# Patient Record
Sex: FEMALE | URBAN - METROPOLITAN AREA
[De-identification: names, ages, dates, MRNs, and addresses within clinical notes are randomized per-mention and may not be internally consistent; named-entity substitution may affect disease eponyms.]

---

## 2021-10-10 ENCOUNTER — EMERGENCY (EMERGENCY)
Facility: HOSPITAL | Age: 81
LOS: 1 days | Discharge: HOME | End: 2021-10-10
Attending: STUDENT IN AN ORGANIZED HEALTH CARE EDUCATION/TRAINING PROGRAM
Payer: COMMERCIAL

## 2021-10-10 VITALS
DIASTOLIC BLOOD PRESSURE: 96 MMHG | SYSTOLIC BLOOD PRESSURE: 171 MMHG | TEMPERATURE: 98 F | HEART RATE: 71 BPM | RESPIRATION RATE: 20 BRPM | WEIGHT: 132.06 LBS | OXYGEN SATURATION: 97 %

## 2021-10-10 PROCEDURE — 99284 EMERGENCY DEPT VISIT MOD MDM: CPT

## 2021-10-10 PROCEDURE — 71250 CT THORAX DX C-: CPT | Mod: 26,MD

## 2021-10-10 PROCEDURE — 71250 CT THORAX DX C-: CPT | Mod: MD

## 2021-10-10 PROCEDURE — 99284 EMERGENCY DEPT VISIT MOD MDM: CPT | Mod: 25

## 2021-10-10 RX ORDER — ACETAMINOPHEN 500 MG
650 TABLET ORAL ONCE
Refills: 0 | Status: COMPLETED | OUTPATIENT
Start: 2021-10-10 | End: 2021-10-10

## 2021-10-10 RX ORDER — LIDOCAINE 4 G/100G
1 CREAM TOPICAL ONCE
Refills: 0 | Status: COMPLETED | OUTPATIENT
Start: 2021-10-10 | End: 2021-10-10

## 2021-10-10 RX ADMIN — LIDOCAINE 1 PATCH: 4 CREAM TOPICAL at 12:59

## 2021-10-10 RX ADMIN — Medication 650 MILLIGRAM(S): at 12:59
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
## 2021-10-10 NOTE — ED PROVIDER NOTE - PHYSICAL EXAMINATION
Gen: NAD, AOx3, able to make needs known, non-toxic  Head: NCAT  HEENT: EOMI, oral mucosa moist, normal conjunctiva  Lung: CTAB, no respiratory distress, no wheezes/rhonchi/rales B/L, speaking in full sentences  CV: RRR, no murmurs  Abd: no distended, soft, nontender, no guarding, no CVA tenderness, no seatbelt sign  MSK: +para thoracic pain, no midline spinal tenderness, no visible deformities  Neuro: Appears non focal  Skin: Warm, well perfused, no rash  Psych: normal affect
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
## 2021-10-10 NOTE — ED ADULT NURSE NOTE - NS ED NOTE  TALK SOMEONE YN
No < from: 12 Lead ECG (07.23.20 @ 10:25) >      Ventricular Rate 80 BPM    Atrial Rate 80 BPM    P-R Interval 126 ms    QRS Duration 80 ms    Q-T Interval 370 ms    QTC Calculation(Bezet) 426 ms    P Axis 63 degrees    R Axis 69 degrees    T Axis 67 degrees    Diagnosis Line Normal sinus rhythm  Normal ECG    < end of copied text >
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
## 2021-10-10 NOTE — ED PROVIDER NOTE - PROGRESS NOTE DETAILS
daughter at bedside. patient comfortable with dc with outpt ortho f/u. informed of incidental findings on CT. has no pain over AC joint. ortho info given for outpt f/u

## 2021-10-10 NOTE — ED PROVIDER NOTE - CARE PROVIDER_API CALL
Derrell Segura)  Orthopaedic Surgery  [address] [place] [address]  Phone: [phone]  Fax: [phone]  Follow Up Time: 7-10 Days

## 2021-10-10 NOTE — ED PROVIDER NOTE - PATIENT PORTAL LINK FT
You can access the FollowMyHealth Patient Portal offered by [facility] by registering at the following website: [email or web address]. By joining [facility]’s FollowMyHealth portal, you will also be able to view your health information using other applications (apps) compatible with our system.

## 2021-10-10 NOTE — ED PROVIDER NOTE - NSFOLLOWUPINSTRUCTIONS_ED_ALL_ED_FT
Acromioclavicular Separation    WHAT YOU NEED TO KNOW:    An acromioclavicular separation (AS), or shoulder separation, is when your shoulder and collarbone move or come apart. The bones move or come apart because the ligaments that hold the bones in place are stretched or torn.    DISCHARGE INSTRUCTIONS:    Medicines: You may need any of the following:   •Acetaminophen decreases pain and is available without a doctor's order. Ask how much to take and how often to take it. Follow directions. Acetaminophen can cause liver damage if not taken correctly.      •NSAIDs help decrease swelling and pain. This medicine is available with or without a doctor's order. NSAIDs can cause stomach bleeding or kidney problems in certain people. If you take blood thinner medicine, always ask your healthcare provider if NSAIDs are safe for you. Always read the medicine label and follow directions.      •A Tetanus (Td) vaccine may be needed if you have an open wound. This vaccine is a booster shot used to help prevent diphtheria and tetanus.      •Take your medicine as directed. Contact your healthcare provider if you think your medicine is not helping or if you have side effects. Tell him if you are allergic to any medicine. Keep a list of the medicines, vitamins, and herbs you take. Include the amounts, and when and why you take them. Bring the list or the pill bottles to follow-up visits. Carry your medicine list with you in case of an emergency.      Apply ice: Apply ice on your shoulder for 15 to 20 minutes every hour for the first 1 to 2 days. Use an ice pack, or put crushed ice in a plastic bag. Cover it with a towel. Ice helps prevent tissue damage and decreases swelling and pain.    Apply heat: Apply heat on your shoulder for 20 to 30 minutes every 2 hours after the first 1 to 2 days. Heat helps decrease pain and muscle spasms.    Wear your support device: You may need to wear a strap, elastic bandage, or sling. These devices keep your shoulder in the correct position so it can heal.   •Wear the strap or sling constantly for 6 to 8 weeks, even when you sleep. You may remove the strap or sling when you bathe. Do not move your shoulder or arm when the strap or sling is off. Do not lift your arm.      •The strap or sling must be tightened by another person every day. Tighten it enough to keep your shoulders back in the correct posture. Tell the person to allow enough room to fit an index finger between your body and the strap. Put a folded wash cloth in your armpit to prevent pressure on the nerves by the strap. Loosen the strap if you feel numbness or tingling in your arm or hand.      Rest your shoulder: Rest your shoulder as much as possible to decrease swelling and help it heal.     Follow up with your healthcare provider as directed: Write down your questions so you remember to ask them during your visits.     Contact your healthcare provider if:   •You have a fever.      •You have worse pain, even after you take medicine.      •You have an open wound that is red, swollen, or draining pus.      •Your arm or hand becomes numb or tingles.      •You have questions or concerns about your condition or care.      Return to the emergency department if:   •You lose feeling in your arm or hand.      •You cannot move your arm or hand.

## 2023-04-12 ENCOUNTER — OFFICE (OUTPATIENT)
Dept: URBAN - METROPOLITAN AREA CLINIC 88 | Facility: CLINIC | Age: 83
Setting detail: OPHTHALMOLOGY
End: 2023-04-12
Payer: MEDICARE

## 2023-04-12 DIAGNOSIS — H43.393: ICD-10-CM

## 2023-04-12 DIAGNOSIS — H35.3132: ICD-10-CM

## 2023-04-12 DIAGNOSIS — H43.813: ICD-10-CM

## 2023-04-12 DIAGNOSIS — H31.002: ICD-10-CM

## 2023-04-12 PROCEDURE — 92235 FLUORESCEIN ANGRPH MLTIFRAME: CPT | Performed by: SPECIALIST

## 2023-04-12 PROCEDURE — 92134 CPTRZ OPH DX IMG PST SGM RTA: CPT | Performed by: SPECIALIST

## 2023-04-12 PROCEDURE — 99213 OFFICE O/P EST LOW 20 MIN: CPT | Performed by: SPECIALIST

## 2023-04-12 ASSESSMENT — VISUAL ACUITY
OS_BCVA: 20/50-
OD_BCVA: 20/40

## 2023-04-12 ASSESSMENT — KERATOMETRY
OS_K1POWER_DIOPTERS: 46.25
OD_K2POWER_DIOPTERS: 48.00
OD_AXISANGLE_DEGREES: 068
OS_K2POWER_DIOPTERS: 47.50
OD_K1POWER_DIOPTERS: 45.75
OS_AXISANGLE_DEGREES: 050

## 2023-04-12 ASSESSMENT — REFRACTION_AUTOREFRACTION
OS_CYLINDER: -1.25
OS_AXIS: 126
OS_SPHERE: +1.00
OD_CYLINDER: -2.00
OD_SPHERE: +0.75
OD_AXIS: 151

## 2023-04-12 ASSESSMENT — AXIALLENGTH_DERIVED
OS_AL: 22.283
OD_AL: 22.5024

## 2023-04-12 ASSESSMENT — CONFRONTATIONAL VISUAL FIELD TEST (CVF)
OS_FINDINGS: FULL
OD_FINDINGS: FULL

## 2023-04-12 ASSESSMENT — SPHEQUIV_DERIVED
OD_SPHEQUIV: -0.25
OS_SPHEQUIV: 0.375

## 2023-04-12 ASSESSMENT — TONOMETRY
OS_IOP_MMHG: 12
OD_IOP_MMHG: 12

## 2024-02-19 PROBLEM — G35 MULTIPLE SCLEROSIS: Chronic | Status: ACTIVE | Noted: 2021-10-10

## 2024-03-02 ENCOUNTER — APPOINTMENT (OUTPATIENT)
Dept: FAMILY MEDICINE | Facility: CLINIC | Age: 84
End: 2024-03-02
Payer: MEDICARE

## 2024-03-02 ENCOUNTER — NON-APPOINTMENT (OUTPATIENT)
Age: 84
End: 2024-03-02

## 2024-03-02 ENCOUNTER — LABORATORY RESULT (OUTPATIENT)
Age: 84
End: 2024-03-02

## 2024-03-02 VITALS
HEIGHT: 63 IN | RESPIRATION RATE: 16 BRPM | HEART RATE: 60 BPM | OXYGEN SATURATION: 97 % | WEIGHT: 145 LBS | BODY MASS INDEX: 25.69 KG/M2 | DIASTOLIC BLOOD PRESSURE: 78 MMHG | TEMPERATURE: 96 F | SYSTOLIC BLOOD PRESSURE: 124 MMHG

## 2024-03-02 VITALS
HEART RATE: 60 BPM | DIASTOLIC BLOOD PRESSURE: 78 MMHG | TEMPERATURE: 96 F | HEIGHT: 64 IN | OXYGEN SATURATION: 97 % | WEIGHT: 145 LBS | SYSTOLIC BLOOD PRESSURE: 124 MMHG | BODY MASS INDEX: 24.75 KG/M2

## 2024-03-02 DIAGNOSIS — E78.5 HYPERLIPIDEMIA, UNSPECIFIED: ICD-10-CM

## 2024-03-02 DIAGNOSIS — M81.0 AGE-RELATED OSTEOPOROSIS W/OUT CURRENT PATHOLOGICAL FRACTURE: ICD-10-CM

## 2024-03-02 DIAGNOSIS — I10 ESSENTIAL (PRIMARY) HYPERTENSION: ICD-10-CM

## 2024-03-02 DIAGNOSIS — Z00.00 ENCOUNTER FOR GENERAL ADULT MEDICAL EXAMINATION W/OUT ABNORMAL FINDINGS: ICD-10-CM

## 2024-03-02 DIAGNOSIS — Z13.29 ENCOUNTER FOR SCREENING FOR OTHER SUSPECTED ENDOCRINE DISORDER: ICD-10-CM

## 2024-03-02 DIAGNOSIS — Z78.9 OTHER SPECIFIED HEALTH STATUS: ICD-10-CM

## 2024-03-02 DIAGNOSIS — Z13.228 ENCOUNTER FOR SCREENING FOR OTHER SUSPECTED ENDOCRINE DISORDER: ICD-10-CM

## 2024-03-02 DIAGNOSIS — Z13.0 ENCOUNTER FOR SCREENING FOR OTHER SUSPECTED ENDOCRINE DISORDER: ICD-10-CM

## 2024-03-02 DIAGNOSIS — G35 MULTIPLE SCLEROSIS: ICD-10-CM

## 2024-03-02 DIAGNOSIS — Z82.49 FAMILY HISTORY OF ISCHEMIC HEART DISEASE AND OTHER DISEASES OF THE CIRCULATORY SYSTEM: ICD-10-CM

## 2024-03-02 PROCEDURE — G0439: CPT

## 2024-03-02 PROCEDURE — 99214 OFFICE O/P EST MOD 30 MIN: CPT

## 2024-03-02 RX ORDER — ALENDRONATE SODIUM 70 MG/1
70 TABLET ORAL
Qty: 12 | Refills: 2 | Status: ACTIVE | COMMUNITY
Start: 1900-01-01 | End: 1900-01-01

## 2024-03-02 RX ORDER — DALFAMPRIDINE 10 MG/1
10 TABLET, FILM COATED, EXTENDED RELEASE ORAL
Refills: 0 | Status: ACTIVE | COMMUNITY

## 2024-03-02 RX ORDER — TERIFLUNOMIDE 14 MG/1
14 TABLET, FILM COATED ORAL
Refills: 0 | Status: ACTIVE | COMMUNITY

## 2024-03-02 RX ORDER — AMANTADINE HYDROCHLORIDE 100 1/1
100 TABLET ORAL TWICE DAILY
Qty: 180 | Refills: 1 | Status: ACTIVE | COMMUNITY
Start: 1900-01-01 | End: 1900-01-01

## 2024-03-02 RX ORDER — BACLOFEN 10 MG/1
10 TABLET ORAL
Refills: 0 | Status: ACTIVE | COMMUNITY

## 2024-03-02 RX ORDER — AMLODIPINE BESYLATE 2.5 MG/1
2.5 TABLET ORAL
Qty: 90 | Refills: 1 | Status: ACTIVE | COMMUNITY
Start: 1900-01-01 | End: 1900-01-01

## 2024-03-02 RX ORDER — ATORVASTATIN CALCIUM 40 MG/1
40 TABLET, FILM COATED ORAL
Qty: 90 | Refills: 1 | Status: ACTIVE | COMMUNITY
Start: 1900-01-01 | End: 1900-01-01

## 2024-03-02 NOTE — HISTORY OF PRESENT ILLNESS
[Family Member] : family member [FreeTextEntry1] : AWV, establish care [de-identified] : 85yo female with PMH of MS with chronic left side weakness and baseline left hand contracture, HTN, HLD who presents to the office to establish care.  Presents with her daughter Jonny Zhao who is HCP.   Previously followed with Dr. Jimmy Gonzalez. Reports difficulty getting into office and provider will be retiring soon, would like to establish care here.   Follows with Dr. Yung Ohara ([place]) for neurology and management of MS.   Patient was [facility] to [facility] from 2/9[facility]-2/12/24 with complaint of hypothermia with Temp 90F and aphasia, worked up to rule out TIA vs MS exacerbation. CT head was negative for acute intracranial hemorrhage or infarct. MRI of brain w/ and w/o contrast showed moderate cerebral white matter disease, tiny T2 hyperintense lesion uppermost cervical cord on the left vs artifact, no acute infarct, no abnormally enhancing lesions. MRA head showed irregularity and mild stenosis of cavernous and supracavernous segments of bilateral ICAs, MRA neck normal. MRI cervical spine showed suspicion for demyelinating plaques at C2-C3, degenerative disc disease.   Patient was being treated for UTI prior to presenting to ED. Daughter reports over the past 12-month period she has had three UTIs, but does not feel normal urinary tract symptoms. With these prior infections she has been confused and after checking the urine has been found to have UTI. During this recent hospitalization, blood culture showed gram variable bacilli, thought to be contaminated, repeat blood culture was normal. Urine culture showed >100k E faecium and she was treated with Zosyn. Hypothermia resolved.   Patient does participate in physical therapy twice a week.  She is planning on having OT and speech therapy at home as well which is ordered by her neurologist.  Has full time caregiver and lives with son.  Uses walker for ambulation at baseline, has wheelchair today as feeling weak from recent hospitalization.   History of falls, reports about 3 years ago she fell and fractured her hip. Daughter reports improvement in falls since she has full time assistance. Has had a few falls this year but without injury.  Daughter was concerned about patient's memory, recently saw neurologist and score 28/30 on MMSE. She believes her mother is very hard of hearing which may be causing her confusion. She is planning on taking her to ENT to be fitted for hearing aides.

## 2024-03-02 NOTE — ASSESSMENT
[FreeTextEntry1] : #HCM - Patient presenting for annual physical exam - Received flu vaccine 11/2023 - Based on USPSTF screening guidelines and shared decision-making with patient, age out of breast, cervical and colon cancer screenings  - ECG not required - Will check routine blood work today and call patient with results   #Recurrent UTI - Recently admitted with episode of aphasia and hypothermia, ruled out for TIA - Found to have UTI with E faecium, treated with antibiotics with improvement in mental status and temperature - Per daughter, patient with history of three UTIs within past 12 months, does not experience classic UTI symptoms - Recommend referral to urologist given more frequent UTIs, question if candidate for suppressive antibiotic therapy   #HTN - BP well controlled today - Refill amlodipine  #HLD - Check fasting lipid panel - Refill atorvastatin  #Osteoporosis - Refill alendronate   #MS - Chronic left hand contracture and left lower extremity weakness - Follows with Dr. Yung Ohara in [place] - On amantadine, aubagio, baclofen, dalfampridine managed by neurology - Receives PT, OT, speech scripts by neurology

## 2024-03-02 NOTE — HEALTH RISK ASSESSMENT
[No] : In the past 12 months have you used drugs other than those required for medical reasons? No [Two or more falls in past year] : Patient reported two or more falls in the past year [Assistive Device] : Patient uses an assistive device [Change in mental status noted] : Change in mental status noted [Transportation] : transportation [With Family] : lives with family [] :  [Reports changes in hearing] : Reports changes in hearing [Smoke Detector] : smoke detector [Carbon Monoxide Detector] : carbon monoxide detector [Name: ___] : Health Care Proxy's Name: [unfilled]  [Reviewed no changes] : Reviewed, no changes [Relationship: ___] : Relationship: [unfilled] [DNR] : DNR [I will adhere to the patient's wishes.] : I will adhere to the patient's wishes. [Former] : Former [0-4] : 0-4 [> 15 Years] : > 15 Years [de-identified] : balanced diet, has good appetite [de-identified] : PT [de-identified] : walker, wheelchair [Patient reported PAP Smear was normal] : Patient reported PAP Smear was normal [Patient reported mammogram was normal] : Patient reported mammogram was normal [Patient reported colonoscopy was normal] : Patient reported colonoscopy was normal [Patient reported bone density results were abnormal] : Patient reported bone density results were abnormal [Behavior] : denies difficulty with behavior [Language] : denies difficulty with language [Learning/Retaining New Information] : denies difficulty learning/retaining new information [Reasoning] : denies difficulty with reasoning [Handling Complex Tasks] : denies difficulty handling complex tasks [Spatial Ability and Orientation] : denies difficulty with spatial ability and orientation [Independent] : using telephone [Some assistance needed] : managing finances [Full assistance needed] : doing laundry [Reports changes in dental health] : Reports no changes in dental health [Reports changes in vision] : Reports no changes in vision [MammogramComments] : history of normal mammograms [MammogramDate] : 01/14 [BoneDensityComments] : osteoporosis [PapSmearComments] : cannot recall last pap smear, always normal  [ColonoscopyDate] : 01/14 [de-identified] : diminished hearing [ColonoscopyComments] : normal; family history of colon cancer in brother [AdvancecareDate] : 03/24 [FreeTextEntry4] : DNR [de-identified] : macular degeneration

## 2024-03-02 NOTE — REVIEW OF SYSTEMS
[Fever] : no fever [Fatigue] : fatigue [Chills] : no chills [Sore Throat] : no sore throat [Nasal Discharge] : no nasal discharge [Palpitations] : no palpitations [Chest Pain] : no chest pain [Shortness Of Breath] : no shortness of breath [Cough] : no cough [Nausea] : no nausea [Abdominal Pain] : no abdominal pain [Diarrhea] : diarrhea [Constipation] : no constipation [Vomiting] : no vomiting [Dysuria] : no dysuria [Frequency] : no frequency [Joint Pain] : no joint pain [Muscle Pain] : no muscle pain [Muscle Weakness] : muscle weakness [Skin Rash] : no skin rash [Itching] : no itching [Dizziness] : no dizziness [Headache] : no headache

## 2024-03-02 NOTE — PHYSICAL EXAM
[Normal Sclera/Conjunctiva] : normal sclera/conjunctiva [No Acute Distress] : no acute distress [Well-Appearing] : well-appearing [EOMI] : extraocular movements intact [Normal Outer Ear/Nose] : the outer ears and nose were normal in appearance [Normal Oropharynx] : the oropharynx was normal [Supple] : supple [No Respiratory Distress] : no respiratory distress  [No Lymphadenopathy] : no lymphadenopathy [Clear to Auscultation] : lungs were clear to auscultation bilaterally [Regular Rhythm] : with a regular rhythm [Normal Rate] : normal rate  [No Murmur] : no murmur heard [Normal S1, S2] : normal S1 and S2 [Non Tender] : non-tender [Soft] : abdomen soft [Non-distended] : non-distended [Coordination Grossly Intact] : coordination grossly intact [No Rash] : no rash [Normal Affect] : the affect was normal [Normal Insight/Judgement] : insight and judgment were intact [de-identified] : chronic left hand contracture, chronic left lower extremity weakness

## 2024-03-04 ENCOUNTER — LABORATORY RESULT (OUTPATIENT)
Age: 84
End: 2024-03-04

## 2024-03-08 LAB
ALBUMIN SERPL ELPH-MCNC: 3.7 G/DL
ALP BLD-CCNC: 235 U/L
ALT SERPL-CCNC: 54 U/L
ANION GAP SERPL CALC-SCNC: 16 MMOL/L
APPEARANCE: ABNORMAL
AST SERPL-CCNC: 50 U/L
BASOPHILS # BLD AUTO: 0 K/UL
BASOPHILS NFR BLD AUTO: 0 %
BILIRUB SERPL-MCNC: 0.4 MG/DL
BILIRUBIN URINE: NEGATIVE
BLOOD URINE: ABNORMAL
BUN SERPL-MCNC: 19 MG/DL
CALCIUM SERPL-MCNC: 10.2 MG/DL
CHLORIDE SERPL-SCNC: 100 MMOL/L
CHOLEST SERPL-MCNC: 164 MG/DL
CO2 SERPL-SCNC: 22 MMOL/L
COLOR: YELLOW
CREAT SERPL-MCNC: 0.77 MG/DL
EGFR: 77 ML/MIN/1.73M2
EOSINOPHIL # BLD AUTO: 0 K/UL
EOSINOPHIL NFR BLD AUTO: 0 %
ESTIMATED AVERAGE GLUCOSE: 100 MG/DL
GLUCOSE QUALITATIVE U: NEGATIVE MG/DL
GLUCOSE SERPL-MCNC: 117 MG/DL
HBA1C MFR BLD HPLC: 5.1 %
HCT VFR BLD CALC: 39.4 %
HDLC SERPL-MCNC: 64 MG/DL
HGB BLD-MCNC: 12.7 G/DL
KETONES URINE: NEGATIVE MG/DL
LDLC SERPL CALC-MCNC: 75 MG/DL
LEUKOCYTE ESTERASE URINE: ABNORMAL
LYMPHOCYTES # BLD AUTO: 1.45 K/UL
LYMPHOCYTES NFR BLD AUTO: 17.4 %
MAN DIFF?: NORMAL
MCHC RBC-ENTMCNC: 27.8 PG
MCHC RBC-ENTMCNC: 32.2 GM/DL
MCV RBC AUTO: 86.2 FL
MONOCYTES # BLD AUTO: 0.29 K/UL
MONOCYTES NFR BLD AUTO: 3.5 %
NEUTROPHILS # BLD AUTO: 6.6 K/UL
NEUTROPHILS NFR BLD AUTO: 79.1 %
NITRITE URINE: POSITIVE
NONHDLC SERPL-MCNC: 100 MG/DL
PH URINE: 6.5
PLATELET # BLD AUTO: 176 K/UL
POTASSIUM SERPL-SCNC: 4.5 MMOL/L
PROT SERPL-MCNC: 6.3 G/DL
PROTEIN URINE: NORMAL MG/DL
RBC # BLD: 4.57 M/UL
RBC # FLD: 18.6 %
SODIUM SERPL-SCNC: 138 MMOL/L
SPECIFIC GRAVITY URINE: 1.01
TRIGL SERPL-MCNC: 149 MG/DL
TSH SERPL-ACNC: 6.77 UIU/ML
UROBILINOGEN URINE: 0.2 MG/DL
WBC # FLD AUTO: 8.34 K/UL

## 2024-03-11 LAB — BACTERIA UR CULT: NORMAL

## 2024-03-15 RX ORDER — AMLODIPINE BESYLATE 5 MG/1
5 TABLET ORAL
Qty: 90 | Refills: 1 | Status: ACTIVE | COMMUNITY
Start: 2024-03-15 | End: 1900-01-01

## 2024-03-25 ENCOUNTER — TRANSCRIPTION ENCOUNTER (OUTPATIENT)
Age: 84
End: 2024-03-25

## 2024-03-25 ENCOUNTER — APPOINTMENT (OUTPATIENT)
Dept: UROLOGY | Facility: CLINIC | Age: 84
End: 2024-03-25
Payer: MEDICARE

## 2024-03-25 VITALS
HEART RATE: 69 BPM | BODY MASS INDEX: 25.69 KG/M2 | HEIGHT: 63 IN | SYSTOLIC BLOOD PRESSURE: 117 MMHG | OXYGEN SATURATION: 99 % | DIASTOLIC BLOOD PRESSURE: 67 MMHG | WEIGHT: 145 LBS

## 2024-03-25 DIAGNOSIS — N39.0 URINARY TRACT INFECTION, SITE NOT SPECIFIED: ICD-10-CM

## 2024-03-25 PROCEDURE — 99203 OFFICE O/P NEW LOW 30 MIN: CPT | Mod: 25

## 2024-03-25 PROCEDURE — 51701 INSERT BLADDER CATHETER: CPT

## 2024-03-25 RX ORDER — ESTRADIOL 0.1 MG/G
0.1 CREAM VAGINAL
Qty: 1 | Refills: 1 | Status: ACTIVE | COMMUNITY
Start: 2024-03-25 | End: 1900-01-01

## 2024-03-25 NOTE — PHYSICAL EXAM
[Normal Appearance] : normal appearance [Well Groomed] : well groomed [General Appearance - In No Acute Distress] : no acute distress [Edema] : no peripheral edema [Exaggerated Use Of Accessory Muscles For Inspiration] : no accessory muscle use [Respiration, Rhythm And Depth] : normal respiratory rhythm and effort [Normal Station and Gait] : the gait and station were normal for the patient's age [No Focal Deficits] : no focal deficits [] : no rash [Oriented To Time, Place, And Person] : oriented to person, place, and time [Affect] : the affect was normal [Mood] : the mood was normal

## 2024-03-25 NOTE — ASSESSMENT
[FreeTextEntry1] : Recurrent UTIs: will start estrace cream check culture will start daily prophylactic abx based on culture f/u in 3 months

## 2024-03-25 NOTE — HISTORY OF PRESENT ILLNESS
[FreeTextEntry1] : 85yo F hx of MS on wheelchair presents with son for recurrent UTIs Son states his mother was hospitalized recently with hypothermia and another time with altered mental status due to UTI diagnosis She doesn't have any bothersome urinary complaints  pvr ~100cc as per son when checked in hospital

## 2024-03-29 RX ORDER — CIPROFLOXACIN HYDROCHLORIDE 500 MG/1
500 TABLET, FILM COATED ORAL DAILY
Qty: 5 | Refills: 0 | Status: COMPLETED | COMMUNITY
Start: 2024-03-08 | End: 2024-03-29

## 2024-03-30 LAB — BACTERIA UR CULT: ABNORMAL

## 2024-04-01 ENCOUNTER — NON-APPOINTMENT (OUTPATIENT)
Age: 84
End: 2024-04-01

## 2024-04-01 RX ORDER — LINEZOLID 600 MG/1
600 TABLET, FILM COATED ORAL
Qty: 14 | Refills: 0 | Status: ACTIVE | COMMUNITY
Start: 2024-04-01 | End: 1900-01-01

## 2024-07-22 ENCOUNTER — APPOINTMENT (OUTPATIENT)
Dept: UROLOGY | Facility: CLINIC | Age: 84
End: 2024-07-22